# Patient Record
Sex: FEMALE | Race: WHITE | NOT HISPANIC OR LATINO | Employment: UNEMPLOYED | URBAN - NONMETROPOLITAN AREA
[De-identification: names, ages, dates, MRNs, and addresses within clinical notes are randomized per-mention and may not be internally consistent; named-entity substitution may affect disease eponyms.]

---

## 2023-10-06 PROBLEM — N91.4 SECONDARY OLIGOMENORRHEA: Status: ACTIVE | Noted: 2023-10-06

## 2023-10-06 PROBLEM — F32.9 REACTIVE DEPRESSION: Status: ACTIVE | Noted: 2023-10-06

## 2023-10-06 PROBLEM — O14.00 MILD PRE-ECLAMPSIA (HHS-HCC): Status: ACTIVE | Noted: 2023-10-06

## 2023-10-06 PROBLEM — O99.210 OBESITY AFFECTING PREGNANCY, ANTEPARTUM (HHS-HCC): Status: ACTIVE | Noted: 2023-10-06

## 2023-10-06 PROBLEM — E28.2 PCOS (POLYCYSTIC OVARIAN SYNDROME): Status: ACTIVE | Noted: 2023-10-06

## 2023-10-06 PROBLEM — N70.11 HYDROSALPINX: Status: ACTIVE | Noted: 2023-10-06

## 2023-10-06 PROBLEM — R00.0 TACHYCARDIA: Status: ACTIVE | Noted: 2023-10-06

## 2023-10-06 PROBLEM — N92.0 MENORRHAGIA: Status: ACTIVE | Noted: 2023-10-06

## 2023-10-06 PROBLEM — I20.9 ANGINA PECTORIS (CMS-HCC): Status: ACTIVE | Noted: 2023-10-06

## 2023-10-06 PROBLEM — Z86.32 HISTORY OF GESTATIONAL DIABETES: Status: ACTIVE | Noted: 2023-10-06

## 2023-10-06 PROBLEM — Z87.59 HISTORY OF PREGNANCY INDUCED HYPERTENSION: Status: ACTIVE | Noted: 2023-10-06

## 2023-10-06 PROBLEM — N83.201 HEMORRHAGIC CYST OF RIGHT OVARY: Status: ACTIVE | Noted: 2023-10-06

## 2023-10-06 PROBLEM — O92.70 LACTATION DISORDER (HHS-HCC): Status: ACTIVE | Noted: 2023-10-06

## 2023-10-06 PROBLEM — N83.292 OTHER OVARIAN CYST, LEFT SIDE: Status: ACTIVE | Noted: 2023-10-06

## 2023-10-06 PROBLEM — N91.2 AMENORRHEA: Status: ACTIVE | Noted: 2023-10-06

## 2023-10-06 PROBLEM — N97.9 FEMALE INFERTILITY: Status: ACTIVE | Noted: 2023-10-06

## 2023-10-06 PROBLEM — N92.1 PROLONGED MENSTRUATION: Status: ACTIVE | Noted: 2023-10-06

## 2023-10-06 PROBLEM — F43.9 STRESS: Status: ACTIVE | Noted: 2023-10-06

## 2023-10-06 PROBLEM — O99.211 OBESITY COMPLICATING PREGNANCY, FIRST TRIMESTER (HHS-HCC): Status: ACTIVE | Noted: 2023-10-06

## 2023-10-06 PROBLEM — N93.9 ABNORMAL UTERINE BLEEDING (AUB): Status: ACTIVE | Noted: 2023-10-06

## 2023-10-06 PROBLEM — J03.91 RECURRENT TONSILLITIS: Status: ACTIVE | Noted: 2023-10-06

## 2023-10-06 PROBLEM — G43.909 MIGRAINE HEADACHE: Status: ACTIVE | Noted: 2023-10-06

## 2023-10-06 PROBLEM — N92.6 IRREGULAR MENSES: Status: ACTIVE | Noted: 2023-10-06

## 2023-10-06 RX ORDER — CHOLECALCIFEROL (VITAMIN D3) 25 MCG
25 TABLET ORAL DAILY
COMMUNITY
End: 2023-10-12 | Stop reason: WASHOUT

## 2023-10-06 RX ORDER — IBUPROFEN 600 MG/1
600 TABLET ORAL EVERY 6 HOURS PRN
COMMUNITY
Start: 2022-01-30 | End: 2023-10-12 | Stop reason: WASHOUT

## 2023-10-06 RX ORDER — SERTRALINE HYDROCHLORIDE 50 MG/1
50 TABLET, FILM COATED ORAL DAILY
COMMUNITY
End: 2023-10-12 | Stop reason: WASHOUT

## 2023-10-06 RX ORDER — METOPROLOL TARTRATE 25 MG/1
25 TABLET, FILM COATED ORAL AS NEEDED
COMMUNITY
Start: 2020-06-26 | End: 2023-10-12 | Stop reason: WASHOUT

## 2023-10-06 RX ORDER — CYCLOBENZAPRINE HCL 10 MG
10 TABLET ORAL EVERY 8 HOURS PRN
COMMUNITY
End: 2023-10-12 | Stop reason: WASHOUT

## 2023-10-06 RX ORDER — B-COMPLEX WITH VITAMIN C
TABLET ORAL
COMMUNITY
End: 2023-10-12 | Stop reason: WASHOUT

## 2023-10-06 RX ORDER — ACETAMINOPHEN 325 MG/1
650 TABLET ORAL EVERY 4 HOURS PRN
COMMUNITY
Start: 2020-11-20 | End: 2023-10-12 | Stop reason: WASHOUT

## 2023-10-06 RX ORDER — OXYCODONE AND ACETAMINOPHEN 5; 325 MG/1; MG/1
1 TABLET ORAL EVERY 6 HOURS PRN
COMMUNITY
End: 2023-10-12 | Stop reason: WASHOUT

## 2023-10-06 RX ORDER — ASPIRIN 81 MG/1
TABLET ORAL
COMMUNITY
End: 2023-10-12 | Stop reason: WASHOUT

## 2023-10-12 ENCOUNTER — OFFICE VISIT (OUTPATIENT)
Dept: OBSTETRICS AND GYNECOLOGY | Facility: CLINIC | Age: 28
End: 2023-10-12
Payer: COMMERCIAL

## 2023-10-12 VITALS
HEIGHT: 62 IN | SYSTOLIC BLOOD PRESSURE: 122 MMHG | BODY MASS INDEX: 44.72 KG/M2 | WEIGHT: 243 LBS | DIASTOLIC BLOOD PRESSURE: 82 MMHG

## 2023-10-12 DIAGNOSIS — N91.1 SECONDARY AMENORRHEA: Primary | ICD-10-CM

## 2023-10-12 DIAGNOSIS — E28.2 PCOS (POLYCYSTIC OVARIAN SYNDROME): ICD-10-CM

## 2023-10-12 PROBLEM — D62 ACUTE BLOOD LOSS ANEMIA: Status: RESOLVED | Noted: 2020-09-12 | Resolved: 2023-10-12

## 2023-10-12 LAB — PREGNANCY TEST URINE, POC: NEGATIVE

## 2023-10-12 PROCEDURE — 81025 URINE PREGNANCY TEST: CPT | Performed by: NURSE PRACTITIONER

## 2023-10-12 PROCEDURE — 3074F SYST BP LT 130 MM HG: CPT | Performed by: NURSE PRACTITIONER

## 2023-10-12 PROCEDURE — 3079F DIAST BP 80-89 MM HG: CPT | Performed by: NURSE PRACTITIONER

## 2023-10-12 PROCEDURE — 99213 OFFICE O/P EST LOW 20 MIN: CPT | Performed by: NURSE PRACTITIONER

## 2023-10-12 PROCEDURE — 1036F TOBACCO NON-USER: CPT | Performed by: NURSE PRACTITIONER

## 2023-10-12 RX ORDER — MEDROXYPROGESTERONE ACETATE 10 MG/1
TABLET ORAL
Qty: 10 TABLET | Refills: 11 | Status: SHIPPED | OUTPATIENT
Start: 2023-10-12

## 2023-10-12 NOTE — PROGRESS NOTES
Chief Complaint    Amenorrhea        HPI       Amenorrhea     Additional comments: Chaperone: declined             Comments    Missed period since July, H/O PCOS          Last edited by Dior Will MA on 10/12/2023 10:43 AM.         27 y.o.  female presents for evaluation of secondary amenorrhea.  H/O PCOS.  She had a baby earlier this year in 2023.  Was having regular, normal periods up until July. Periods lasting 5-6 days. Denies abnormal bleeding.  No period since 2023.   Denies pelvic pain.  She is sexually active. Using condoms for contraception.   Does not want to be on birth control due to h/o side effects in the past.  Has had to do progesterone courses to trigger periods in past.    She does endorse recent weight gain. BMI is 44.   OBGYN: . Normal pap history.  Family history: Graves disease - mother.   No family h/o GYN related cancers.     Review of Systems   Genitourinary:  Positive for menstrual problem.   All other systems reviewed and are negative.       Physical Exam  Constitutional:       Appearance: Normal appearance.   Cardiovascular:      Rate and Rhythm: Normal rate and regular rhythm.   Pulmonary:      Effort: Pulmonary effort is normal.      Breath sounds: Normal breath sounds.   Abdominal:      Palpations: Abdomen is soft.   Genitourinary:     Comments: Pelvic exam deferred  Musculoskeletal:         General: Normal range of motion.      Cervical back: Normal range of motion and neck supple.   Skin:     General: Skin is warm and dry.   Neurological:      Mental Status: She is alert.   Psychiatric:         Mood and Affect: Mood normal.         Behavior: Behavior normal.     1. Secondary amenorrhea  -Likely secondary to known h/o PCOS.   -Negative POCT pregnancy, urine manually resulted  -Rx medroxyPROGESTERone (Provera) 10 mg tablet; Take 1 tablet by mouth daily for days first 10 days of every month  Dispense: 10 tablet; Refill: 11  -Advised to follow up if period does not  start within 7 days of completing medication or for any abnormal uterine bleeding.    2. PCOS (polycystic ovarian syndrome)  -Counseled risk reduction and management options including hormonal birth control vs cyclic progesterone.  -Patient does not want to be on birth control due to undesirable side effects of this in the past.  -Plan cyclic progesterone for first 10 days of every month.  -Encouraged weight loss through healthy diet and exercise. Even a reduction in 5-10% of body weight may help restore ovulatory cycles.   -Rx medroxyPROGESTERone (Provera) 10 mg tablet; Take 1 tablet by mouth daily for days first 10 days of every month  Dispense: 10 tablet; Refill: 11

## 2023-10-30 ENCOUNTER — TELEPHONE (OUTPATIENT)
Dept: OBSTETRICS AND GYNECOLOGY | Facility: CLINIC | Age: 28
End: 2023-10-30

## 2023-11-06 ENCOUNTER — HOSPITAL ENCOUNTER (OUTPATIENT)
Facility: HOSPITAL | Age: 28
Setting detail: OUTPATIENT SURGERY
End: 2023-11-06
Attending: PODIATRIST | Admitting: PODIATRIST
Payer: COMMERCIAL

## 2024-03-20 ENCOUNTER — APPOINTMENT (OUTPATIENT)
Dept: PREADMISSION TESTING | Facility: HOSPITAL | Age: 29
End: 2024-03-20
Payer: COMMERCIAL

## 2024-05-16 ENCOUNTER — TELEPHONE (OUTPATIENT)
Dept: OBSTETRICS AND GYNECOLOGY | Facility: CLINIC | Age: 29
End: 2024-05-16
Payer: COMMERCIAL

## 2024-07-02 ENCOUNTER — APPOINTMENT (OUTPATIENT)
Dept: OBSTETRICS AND GYNECOLOGY | Facility: CLINIC | Age: 29
End: 2024-07-02
Payer: COMMERCIAL

## 2024-07-11 ENCOUNTER — APPOINTMENT (OUTPATIENT)
Dept: OBSTETRICS AND GYNECOLOGY | Facility: CLINIC | Age: 29
End: 2024-07-11
Payer: COMMERCIAL

## 2024-10-14 DIAGNOSIS — N91.1 SECONDARY AMENORRHEA: ICD-10-CM

## 2024-10-14 DIAGNOSIS — E28.2 PCOS (POLYCYSTIC OVARIAN SYNDROME): ICD-10-CM

## 2024-10-15 RX ORDER — MEDROXYPROGESTERONE ACETATE 10 MG/1
TABLET ORAL
Qty: 10 TABLET | Refills: 0 | Status: SHIPPED | OUTPATIENT
Start: 2024-10-15

## 2024-11-06 NOTE — PROGRESS NOTES
Subjective     Patient ID: Adalgisa Sandoval is a 28 y.o. female who presents for No chief complaint on file..    HPI    Review of Systems    Social history:  reports that she has never smoked. She has never used smokeless tobacco. She reports current alcohol use. She reports that she does not use drugs.  Medical history:   Past Medical History:   Diagnosis Date    Acute blood loss anemia 09/12/2020    Acute pharyngitis, unspecified 10/22/2013    Acute viral pharyngitis    Acute upper respiratory infection, unspecified 10/22/2013    Acute upper respiratory infection    Cough, unspecified 10/22/2013    Cough    Encounter for pregnancy test, result unknown     Encounter for pregnancy test    Menstrual migraine 08/28/2014    Personal history of other diseases of the musculoskeletal system and connective tissue 02/27/2017    History of low back pain    Personal history of other diseases of the respiratory system 02/16/2018    History of acute pharyngitis    Personal history of other drug therapy     History of varicella vaccination    Polycystic ovarian syndrome 06/26/2019    PCOS (polycystic ovarian syndrome)    Preeclampsia in postpartum period ([facility]) 09/12/2020    Sprain of ligaments of lumbar spine, initial encounter 02/27/2017    Lumbar sprain    Strain of unspecified muscle and tendon at ankle and foot level, left foot, initial encounter 07/31/2017    Strain of left ankle, initial encounter    Strain of unspecified muscle and tendon at ankle and foot level, left foot, initial encounter 07/31/2017    Left ankle strain      SurgHx:   Past Surgical History:   Procedure Laterality Date    OTHER SURGICAL HISTORY  08/12/2019    Endometrial biopsy      Fhx:   Family History   Problem Relation Name Age of Onset    Graves' disease Mother      No Known Problems Father      Diabetes Brother      Cerebral aneurysm Other Aunt       Medications:   Current Outpatient Medications:     medroxyPROGESTERone (Provera) 10 mg  tablet, TAKE 1 TABLET BY MOUTH DAILY FOR THE FIRST 10 DAYS OF EVERY MONTH, Disp: 10 tablet, Rfl: 0   Allergies:   Allergies   Allergen Reactions    Ibuprofen Unknown        Review of systems completed and unremarkable other than what is documented in HPI.    Objective   There were no vitals taken for this visit.  Physical Exam    Gen: No acute distress, alert and oriented x3, pleasant   HEENT: moist mucous membranes, b/l external auditory canals are clear of debris, TMs within normal limits, no oropharyngeal lesions, eomi, perrla   Neck: thyroid within normal limits, no lymphadenopathy   CV: RRR, normal S1/S2, no murmur   Resp: Clear to auscultation bilaterally, no wheezes or rhonchi appreciated  Abd: soft, nontender, non-distended, no guarding/rigidity, bowel sounds present  Extr: no edema, no calf tenderness  Derm: Skin is warm and dry, no rashes appreciated  Psych: mood is good, affect is congruent, good hygiene, normal speech and eye contact  Neuro: cranial nerves grossly intact, normal gait      Assessment/Plan   {Assess/PlanSmartLinks:77189}

## 2024-11-07 ENCOUNTER — APPOINTMENT (OUTPATIENT)
Dept: PRIMARY CARE | Facility: CLINIC | Age: 29
End: 2024-11-07
Payer: COMMERCIAL

## 2024-11-13 NOTE — PROGRESS NOTES
Subjective     Patient ID: Adalgisa Sandoval is a 28 y.o. female who presents for a new patient visit    HPI  Adalgisa is here to establish care has questions about weight loss.     Weight loss: Summer was at 275# today she is 243#, Marisol goal weight is 170#, she has been cutting back on calories and carbohydrates, eating , and trying to exercise. She is interested in medications to help aide the process.   Currently is taking a metabolism gummy by Vlad,     In the past she had SOB, Stuck in the throat feeling, dizzy, palpitations, pounding heart  - lays down it gets really bad, has not had it in two months,     Was on zoloft in the past for depression.Feels it has not improved since having her children.     She does need a TB test completed for work    Social history:    She has never used tobacco products  Does not vape   reports current alcohol use- socially  She reports that she does not use drugs.    Past Medical History:   Diagnosis Date    Acute blood loss anemia 09/12/2020    Acute pharyngitis, unspecified 10/22/2013    Acute viral pharyngitis    Acute upper respiratory infection, unspecified 10/22/2013    Acute upper respiratory infection    Cough, unspecified 10/22/2013    Cough    Encounter for pregnancy test, result unknown     Encounter for pregnancy test    Menstrual migraine 08/28/2014    Personal history of other diseases of the musculoskeletal system and connective tissue 02/27/2017    History of low back pain    Personal history of other diseases of the respiratory system 02/16/2018    History of acute pharyngitis    Personal history of other drug therapy     History of varicella vaccination    Polycystic ovarian syndrome 06/26/2019    PCOS (polycystic ovarian syndrome)    Preeclampsia in [facility] period ([facility]) 09/12/2020    Sprain of ligaments of lumbar spine, initial encounter 02/27/2017    Lumbar sprain    Strain of unspecified muscle and tendon at ankle and foot level, left foot,  initial encounter 07/31/2017    Strain of left ankle, initial encounter    Strain of unspecified muscle and tendon at ankle and foot level, left foot, initial encounter 07/31/2017    Left ankle strain     Past Surgical History:   Procedure Laterality Date    OTHER SURGICAL HISTORY  08/12/2019    Endometrial biopsy     Family History   Problem Relation Name Age of Onset    Graves' disease Mother      No Known Problems Father      Diabetes Brother      Cerebral aneurysm Other Aunt       Medications:   None on file    Allergies   Allergen Reactions    Ibuprofen Unknown        Review of systems completed and unremarkable other than what is documented in HPI.    Objective   There were no vitals taken for this visit.  Physical Exam    Gen: No acute distress, alert and oriented x3, pleasant   HEENT: moist mucous membranes, b/l external auditory canals are clear of debris, TMs within normal limits, no oropharyngeal lesions, eomi, perrla   Neck: thyroid within normal limits, no lymphadenopathy   CV: RRR, normal S1/S2, no murmur   Resp: Clear to auscultation bilaterally, no wheezes or rhonchi appreciated  Abd: soft, nontender, non-distended, no guarding/rigidity, bowel sounds present  Extr: no edema, no calf tenderness  Derm: Skin is warm and dry, no rashes appreciated  Psych: mood is good, affect is congruent, good hygiene, normal speech and eye contact  Neuro: cranial nerves grossly intact, normal gait  Assessment & Plan  Encounter for screening for respiratory tuberculosis  Orders:    T-Spot TB; Future    Anxiety  Orders:    buPROPion XL (Wellbutrin XL) 150 mg 24 hr tablet; Take 1 tablet (150 mg) by mouth once daily in the morning. Do not crush, chew, or split.    Class 3 severe obesity due to excess calories without serious comorbidity with body mass index (BMI) of 40.0 to 44.9 in adult    Orders:    Referral to Nutrition Services; Future      #HCM  Routine labs  Pap done 5/2023 normal  Follow up 6 weeks

## 2024-11-18 ENCOUNTER — APPOINTMENT (OUTPATIENT)
Dept: PRIMARY CARE | Facility: CLINIC | Age: 29
End: 2024-11-18
Payer: COMMERCIAL

## 2024-11-18 ENCOUNTER — LAB (OUTPATIENT)
Dept: LAB | Facility: LAB | Age: 29
End: 2024-11-18
Payer: COMMERCIAL

## 2024-11-18 VITALS — SYSTOLIC BLOOD PRESSURE: 110 MMHG | DIASTOLIC BLOOD PRESSURE: 75 MMHG | HEART RATE: 90 BPM

## 2024-11-18 DIAGNOSIS — Z78.9 HEALTH MANAGEMENT DEFICIT: ICD-10-CM

## 2024-11-18 DIAGNOSIS — Z00.00 ROUTINE GENERAL MEDICAL EXAMINATION AT A HEALTH CARE FACILITY: ICD-10-CM

## 2024-11-18 DIAGNOSIS — Z11.1 ENCOUNTER FOR SCREENING FOR RESPIRATORY TUBERCULOSIS: ICD-10-CM

## 2024-11-18 DIAGNOSIS — E66.01 CLASS 3 SEVERE OBESITY DUE TO EXCESS CALORIES WITHOUT SERIOUS COMORBIDITY WITH BODY MASS INDEX (BMI) OF 40.0 TO 44.9 IN ADULT: Primary | ICD-10-CM

## 2024-11-18 DIAGNOSIS — F41.9 ANXIETY: ICD-10-CM

## 2024-11-18 DIAGNOSIS — E66.813 CLASS 3 SEVERE OBESITY DUE TO EXCESS CALORIES WITHOUT SERIOUS COMORBIDITY WITH BODY MASS INDEX (BMI) OF 40.0 TO 44.9 IN ADULT: Primary | ICD-10-CM

## 2024-11-18 LAB
ALBUMIN SERPL BCP-MCNC: 4.5 G/DL (ref 3.4–5)
ALP SERPL-CCNC: 73 U/L (ref 33–110)
ALT SERPL W P-5'-P-CCNC: 42 U/L (ref 7–45)
ANION GAP SERPL CALC-SCNC: 10 MMOL/L (ref 10–20)
AST SERPL W P-5'-P-CCNC: 28 U/L (ref 9–39)
BASOPHILS # BLD AUTO: 0.05 X10*3/UL (ref 0–0.1)
BASOPHILS NFR BLD AUTO: 0.6 %
BILIRUB SERPL-MCNC: 0.6 MG/DL (ref 0–1.2)
BUN SERPL-MCNC: 12 MG/DL (ref 6–23)
CALCIUM SERPL-MCNC: 9.6 MG/DL (ref 8.6–10.3)
CHLORIDE SERPL-SCNC: 104 MMOL/L (ref 98–107)
CHOLEST SERPL-MCNC: 185 MG/DL (ref 0–199)
CHOLESTEROL/HDL RATIO: 5.5
CO2 SERPL-SCNC: 30 MMOL/L (ref 21–32)
CREAT SERPL-MCNC: 0.78 MG/DL (ref 0.5–1.05)
EGFRCR SERPLBLD CKD-EPI 2021: >90 ML/MIN/1.73M*2
EOSINOPHIL # BLD AUTO: 0.11 X10*3/UL (ref 0–0.7)
EOSINOPHIL NFR BLD AUTO: 1.3 %
ERYTHROCYTE [DISTWIDTH] IN BLOOD BY AUTOMATED COUNT: 12.8 % (ref 11.5–14.5)
GLUCOSE SERPL-MCNC: 70 MG/DL (ref 74–99)
HCT VFR BLD AUTO: 44.3 % (ref 36–46)
HDLC SERPL-MCNC: 33.8 MG/DL
HGB BLD-MCNC: 13.8 G/DL (ref 12–16)
IMM GRANULOCYTES # BLD AUTO: 0.03 X10*3/UL (ref 0–0.7)
IMM GRANULOCYTES NFR BLD AUTO: 0.3 % (ref 0–0.9)
LDLC SERPL CALC-MCNC: 122 MG/DL
LYMPHOCYTES # BLD AUTO: 2.51 X10*3/UL (ref 1.2–4.8)
LYMPHOCYTES NFR BLD AUTO: 29.1 %
MCH RBC QN AUTO: 27.2 PG (ref 26–34)
MCHC RBC AUTO-ENTMCNC: 31.2 G/DL (ref 32–36)
MCV RBC AUTO: 87 FL (ref 80–100)
MONOCYTES # BLD AUTO: 0.6 X10*3/UL (ref 0.1–1)
MONOCYTES NFR BLD AUTO: 7 %
NEUTROPHILS # BLD AUTO: 5.32 X10*3/UL (ref 1.2–7.7)
NEUTROPHILS NFR BLD AUTO: 61.7 %
NON HDL CHOLESTEROL: 151 MG/DL (ref 0–149)
NRBC BLD-RTO: 0 /100 WBCS (ref 0–0)
PLATELET # BLD AUTO: 332 X10*3/UL (ref 150–450)
POTASSIUM SERPL-SCNC: 4.7 MMOL/L (ref 3.5–5.3)
PROT SERPL-MCNC: 7.6 G/DL (ref 6.4–8.2)
RBC # BLD AUTO: 5.08 X10*6/UL (ref 4–5.2)
SODIUM SERPL-SCNC: 139 MMOL/L (ref 136–145)
TRIGL SERPL-MCNC: 147 MG/DL (ref 0–149)
TSH SERPL-ACNC: 1.64 MIU/L (ref 0.44–3.98)
VLDL: 29 MG/DL (ref 0–40)
WBC # BLD AUTO: 8.6 X10*3/UL (ref 4.4–11.3)

## 2024-11-18 PROCEDURE — 85025 COMPLETE CBC W/AUTO DIFF WBC: CPT

## 2024-11-18 PROCEDURE — 3078F DIAST BP <80 MM HG: CPT

## 2024-11-18 PROCEDURE — 86481 TB AG RESPONSE T-CELL SUSP: CPT

## 2024-11-18 PROCEDURE — 3074F SYST BP LT 130 MM HG: CPT

## 2024-11-18 PROCEDURE — 84443 ASSAY THYROID STIM HORMONE: CPT

## 2024-11-18 PROCEDURE — 80053 COMPREHEN METABOLIC PANEL: CPT

## 2024-11-18 PROCEDURE — 1036F TOBACCO NON-USER: CPT

## 2024-11-18 PROCEDURE — 80061 LIPID PANEL: CPT

## 2024-11-18 PROCEDURE — 99204 OFFICE O/P NEW MOD 45 MIN: CPT

## 2024-11-18 PROCEDURE — 36415 COLL VENOUS BLD VENIPUNCTURE: CPT

## 2024-11-18 RX ORDER — BUPROPION HYDROCHLORIDE 150 MG/1
150 TABLET ORAL EVERY MORNING
Qty: 30 TABLET | Refills: 1 | Status: SHIPPED | OUTPATIENT
Start: 2024-11-18 | End: 2025-01-17

## 2024-11-18 NOTE — PATIENT INSTRUCTIONS
Nutrition Services:  [place] [phone]  [facility] [phone]

## 2024-11-20 LAB
NIL(NEG) CONTROL SPOT COUNT: NORMAL
PANEL A SPOT COUNT: 0
PANEL B SPOT COUNT: 0
POS CONTROL SPOT COUNT: NORMAL
T-SPOT. TB INTERPRETATION: NEGATIVE

## 2024-11-24 PROBLEM — E66.813 CLASS 3 SEVERE OBESITY DUE TO EXCESS CALORIES WITHOUT SERIOUS COMORBIDITY WITH BODY MASS INDEX (BMI) OF 40.0 TO 44.9 IN ADULT: Status: ACTIVE | Noted: 2024-11-24

## 2024-11-24 PROBLEM — E66.01 CLASS 3 SEVERE OBESITY DUE TO EXCESS CALORIES WITHOUT SERIOUS COMORBIDITY WITH BODY MASS INDEX (BMI) OF 40.0 TO 44.9 IN ADULT: Status: ACTIVE | Noted: 2024-11-24

## 2024-11-24 PROBLEM — Z11.1 ENCOUNTER FOR SCREENING FOR RESPIRATORY TUBERCULOSIS: Status: ACTIVE | Noted: 2024-11-24

## 2024-11-24 PROBLEM — F41.9 ANXIETY: Status: ACTIVE | Noted: 2024-11-24

## 2024-11-24 PROBLEM — Z78.9: Status: ACTIVE | Noted: 2024-11-24

## 2024-11-24 NOTE — ASSESSMENT & PLAN NOTE
Orders:    buPROPion XL (Wellbutrin XL) 150 mg 24 hr tablet; Take 1 tablet (150 mg) by mouth once daily in the morning. Do not crush, chew, or split.

## 2024-12-06 ENCOUNTER — TELEPHONE (OUTPATIENT)
Dept: PRIMARY CARE | Facility: CLINIC | Age: 29
End: 2024-12-06
Payer: COMMERCIAL

## 2025-01-03 ENCOUNTER — APPOINTMENT (OUTPATIENT)
Dept: PRIMARY CARE | Facility: CLINIC | Age: 30
End: 2025-01-03
Payer: COMMERCIAL

## 2025-01-09 ENCOUNTER — NUTRITION (OUTPATIENT)
Dept: NUTRITION | Facility: HOSPITAL | Age: 30
End: 2025-01-09
Payer: COMMERCIAL

## 2025-01-09 VITALS — HEIGHT: 65 IN | BODY MASS INDEX: 40.44 KG/M2

## 2025-01-09 DIAGNOSIS — E66.01 CLASS 3 SEVERE OBESITY DUE TO EXCESS CALORIES WITHOUT SERIOUS COMORBIDITY WITH BODY MASS INDEX (BMI) OF 40.0 TO 44.9 IN ADULT: ICD-10-CM

## 2025-01-09 DIAGNOSIS — E66.813 CLASS 3 SEVERE OBESITY DUE TO EXCESS CALORIES WITHOUT SERIOUS COMORBIDITY WITH BODY MASS INDEX (BMI) OF 40.0 TO 44.9 IN ADULT: ICD-10-CM

## 2025-01-09 DIAGNOSIS — Z00.00 ROUTINE GENERAL MEDICAL EXAMINATION AT A HEALTH CARE FACILITY: ICD-10-CM

## 2025-01-09 PROCEDURE — 97802 MEDICAL NUTRITION INDIV IN: CPT

## 2025-01-09 NOTE — PROGRESS NOTES
Nutrition Initial Assessment:     Patient Adalgisa Sandoval is a 29 y.o. female being seen at [facility] who was referred by JEROME Hightower  for   1. Routine general medical examination at a health care facility  Referral to Nutrition Services      2. Class 3 severe obesity due to excess calories without serious comorbidity with body mass index (BMI) of 40.0 to 44.9 in adult  Referral to Nutrition Services          Nutrition Assessment    Patient Active Problem List   Diagnosis    Abnormal uterine bleeding (AUB)    Recurrent tonsillitis    Angina pectoris    Female infertility    Hemorrhagic cyst of right ovary    Other ovarian cyst, left side    History of gestational diabetes    Hydrosalpinx    Menorrhagia    Migraine headache    Mild pre-eclampsia ([facility]-HCC)    Obesity complicating pregnancy, first trimester ([facility]HS-HCC)    Obesity affecting pregnancy, antepartum ([facility]HS-HCC)    PCOS (polycystic ovarian syndrome)    Postpartum hypertension ([facility]HS-HCC)    Amenorrhea    Irregular menses    Prolonged menstruation    Secondary oligomenorrhea    Reactive depression    Stress    Tachycardia    BMI 39.0-39.9,adult    History of pregnancy induced hypertension    Lactation disorder (HHS-HCC)    Health management deficit    Class 3 severe obesity due to excess calories without serious comorbidity with body mass index (BMI) of 40.0 to 44.9 in adult    Anxiety    Encounter for screening for respiratory tuberculosis     Nutrition History:  Food & Nutrition History: Pt states that she has had a hard time with weight loss and finding changes that are sustainable.Pt would like to learn how to lose weight with PCOS. Pt made dietary changes over the summer like limiting portions, increasing protein and decreasing carbs, and reducing late night snacking and she was able to los about 25lb. Pt reports current weight at 250lb and previous weight was 275-20lb. Pt and provider set goal weight of 180lb. Pt states she  "does not eat much during the day, and will eat dinner and then overeat snacks after dinner.  Food Allergies: NKFA  Food Intolerances: N/A  Vitamin/mineral intake:    Other (Comment) (OLLI metabolism)  GI Symptoms: none; Occurring >2 weeks? no  Oral Problems: denies; Dentition: own  Sleep Habits: 5-6 hrs disrupted, 7+ hrs disrupted (Child)    Diet Recall:  Meal 1: Breakfast- Sometimes what kids are eating  Meal 2: Lunch- If eaten- sandwhich with lunch meat and whole wheat bread  Meal 3: DInner- Vegetable, meat, carb  Snacks: Bowl of cereal with whole milk, pork rinds, beef sticks  Food Variety: Present  Oral Nutrition Supplement Use: Premier Protein (Herbalife)  Fluid Intake: Water, aloe or green tea, occasional juice  Energy Intake: Good > 75 %    Food Preparation:  Cooking: Patient, Spouse/Significant Other  Grocery Shopping: Patient  Dining Out: 1 to 3 times a month    Physical Activity:   Work as [employer], on feet all day. Step machine. Would like to increase activity.    Anthropometrics:  Height: 165.1 cm (5' 5\")   Weight:  (250lb stated weight)                   Weight History:   Daily Weight  10/12/23 : 110 kg (243 lb)  05/04/23 : 108 kg (238 lb)  03/31/23 : 106 kg (233 lb)  03/23/23 : 114 kg (251 lb)  03/16/23 : 117 kg (259 lb)  03/06/23 : 117 kg (257 lb)  03/02/23 : 117 kg (257 lb)  02/28/23 : 116 kg (255 lb 8.2 oz)  02/16/23 : 115 kg (253 lb)  02/09/23 : 114 kg (251 lb)    Weight Change %:  Weight History / % Weight Change: Pt reports 25lb intentional weight loss since summer. Chart has poor weight hx.    Nutrition Focused Physical Exam Findings:  Deferred as pt visually appears well-nourished with no signs of muscle or subcutaneous fat losses    Nutrition Significant Labs:  Lipid Panel trend:    Recent Labs     11/18/24  1205 06/26/19  0906 02/12/18  1415   CHOL 185 156 142   HDL 33.8 36.0* 36.0*   LDLCALC 122*  --   --    LDLF  --  99 78   VLDL 29 21 28   TRIG 147 104 141    and DM specific labs:   Recent " Labs     11/16/23  1606 08/23/22  1637 12/15/21  1453   HGBA1C 5.5 5.4 5.3     Nutrition Specific Medications:  Current Outpatient Medications   Medication Instructions    buPROPion XL (WELLBUTRIN XL) 150 mg, oral, Every morning, Do not crush, chew, or split.       Estimated Needs:  Total Energy Estimated Needs (kCal): 1700 kCal; Method for Estimating Needs: MSJ x AF 1.2 - 500kcal for weight loss  Total Protein Estimated Needs (g): 90 g; Method for Estimating Needs: 0.8g/kg   ;    Total Fluid Estimated Needs (mL): 2244 mL; Method for Estimating Needs: 1ml/kcal     Nutrition Diagnosis      Nutrition Diagnosis  Patient has Nutrition Diagnosis: Yes  Diagnosis Status (1): New  Nutrition Diagnosis 1: Excessive energy intake  Related to (1): food and nutrition related knowledge deficit  As Evidenced by (1): uncontrolled portion sizes of foods per recall     Nutrition Interventions/Recommendations   Nutrition Prescription: Mediterranean diet, Portion Controlled diet, MyPlate Method, Increased Fiber, Increased Protein    Nutrition Interventions:   Food and Nutrient Delivery: Meals & Snacks: General Healthful Diet, Modify Composition of Meals/Snacks, Energy-modified diet, Fiber-modified diet, Protein-modified diet  Goals: Follow mediterranean diet with 3 meals per day and up to 2 snacks per day at consistent times while following serving sizes on the food label. Follow the MyPlate Method for meals and snacks. Increase fiber and protein by consuming each with every meal and snack.     Coordination of Care:   N/A    Nutrition Education:   Nutrition Education Content: Content related nutrition education  Goals: Patient goals: 1. Increase fiber intake by including a variety of fruits, vegetables, and grains with each meal and snack. 2. Add small meal/large snack during the day to better help with satiety at night.    Nutrition Education Topics Discussed:   - Mediterranean diet  -Label Reading and portion sizes  -Reducing  saturated fat  -Reducing added sugar  -Increasing fiber  -Adequate protein  -Foods affecting blood sugar  -Whole grains    Educational Handouts Provided:   Mediterranean diet, label reading, healthy snacks for adults    Readiness to Change : Good  Level of Understanding : Good  Anticipated Compliant : Good     Nutrition Monitoring and Evaluation   Food/Nutrient Related History Monitoring  Monitoring and Evaluation Plan: Meal/snack pattern, Amount of food, Protein intake, Fiber intake  Amount of Food: Estimated amout of food  Criteria: Follow serving sizes by reading food labels and measuring foods.  Meal/Snack Pattern: Estimated meal and snack pattern  Criteria: Consume 3 meals and up to 2 snacks per day while following the Mediterranean diet and MyPlate Method.  Estimated protein intake: Estimated protein intake  Criteria: Consume at least 25g protein with each meal and 10-15g with each snack for adequate protein intake.  Estimated fiber intake: Estimated fiber intake  Criteria: Consume 25g fiber each day by consuming a variety of fruits, vegetables, and whole grains.     Body Composition/Growth/Weight History  Monitoring and Evaluation Plan: Weight  Weight: Measured weight  Criteria: Monitor weight changes for healthy steady weight loss.     Follow Up: 6-8 weeks    Time Spent  Time spent directly with patient, family or caregiver: 45 minutes  Documentation Time: 15 minutes

## 2025-01-16 DIAGNOSIS — Z78.9 HEALTH MANAGEMENT DEFICIT: ICD-10-CM

## 2025-01-16 DIAGNOSIS — F41.9 ANXIETY: ICD-10-CM

## 2025-01-16 DIAGNOSIS — Z00.00 ROUTINE GENERAL MEDICAL EXAMINATION AT A HEALTH CARE FACILITY: ICD-10-CM

## 2025-01-17 RX ORDER — BUPROPION HYDROCHLORIDE 150 MG/1
150 TABLET ORAL EVERY MORNING
Qty: 30 TABLET | Refills: 0 | Status: SHIPPED | OUTPATIENT
Start: 2025-01-17

## 2025-01-24 ENCOUNTER — APPOINTMENT (OUTPATIENT)
Dept: PRIMARY CARE | Facility: CLINIC | Age: 30
End: 2025-01-24
Payer: COMMERCIAL

## 2025-02-19 DIAGNOSIS — Z00.00 ROUTINE GENERAL MEDICAL EXAMINATION AT A HEALTH CARE FACILITY: ICD-10-CM

## 2025-02-19 DIAGNOSIS — Z78.9 HEALTH MANAGEMENT DEFICIT: ICD-10-CM

## 2025-02-19 DIAGNOSIS — F41.9 ANXIETY: ICD-10-CM

## 2025-02-19 RX ORDER — BUPROPION HYDROCHLORIDE 150 MG/1
150 TABLET ORAL EVERY MORNING
Qty: 30 TABLET | Refills: 0 | Status: SHIPPED | OUTPATIENT
Start: 2025-02-19

## 2025-02-21 ENCOUNTER — APPOINTMENT (OUTPATIENT)
Dept: PRIMARY CARE | Facility: CLINIC | Age: 30
End: 2025-02-21
Payer: COMMERCIAL

## 2025-03-26 DIAGNOSIS — Z78.9 HEALTH MANAGEMENT DEFICIT: ICD-10-CM

## 2025-03-26 DIAGNOSIS — Z00.00 ROUTINE GENERAL MEDICAL EXAMINATION AT A HEALTH CARE FACILITY: ICD-10-CM

## 2025-03-26 DIAGNOSIS — F41.9 ANXIETY: ICD-10-CM

## 2025-03-26 RX ORDER — BUPROPION HYDROCHLORIDE 150 MG/1
150 TABLET ORAL EVERY MORNING
Qty: 30 TABLET | Refills: 0 | Status: SHIPPED | OUTPATIENT
Start: 2025-03-26

## 2025-04-03 ENCOUNTER — APPOINTMENT (OUTPATIENT)
Dept: NUTRITION | Facility: HOSPITAL | Age: 30
End: 2025-04-03
Payer: COMMERCIAL

## 2025-04-14 NOTE — PROGRESS NOTES
"Chief Complaint    Annual Exam        HPI    PAP 23 NEG  STD SCREEN 9-15-22 NEG  GARDASIL X3 PER PT  Last edited by Wei Pratt MA on 4/15/2025 10:51 AM.         29 y.o.  female presents for annual well woman exam.   H/O PCOS. Patient's menstrual cycles are irregular, but have been monthly over the past few cycles. Periods lasting 5 days. Denies abnormal bleeding. Has had to do cyclic progesterone in the past. Issues with birth control side effects.   She is sexually active with . Denies dyspareunia.   Contraception: None. Wants to try for pregnancy. Has used Letrazole to conceive in the past.   She denies any breast concerns.   Pap hx. normal. Last pap: 2023 negative  Denies pelvic pain.  Denies abnormal vaginal symptoms.  Denies urinary or bowel concerns.   She is non-smoker  PMH: Anxiety, depression, obesity BMI 45.91  Family h/o breast cancer: None  Family h/o GYN cancer: None  Family h/o colon cancer: PGF?    /80   Ht 1.575 m (5' 2\")   Wt 114 kg (251 lb)   LMP 2025   BMI 45.91 kg/m²       Current Outpatient Medications   Medication Instructions    buPROPion XL (WELLBUTRIN XL) 150 mg, oral, Every morning, DO NOT CRUSH CHEW OR SPLIT        Review of Systems   Constitutional: Negative.    HENT: Negative.     Eyes: Negative.    Respiratory: Negative.     Cardiovascular: Negative.    Gastrointestinal: Negative.    Endocrine: Negative.    Genitourinary: Negative.    Musculoskeletal: Negative.    Skin: Negative.    Allergic/Immunologic: Negative.    Neurological: Negative.    Hematological: Negative.    Psychiatric/Behavioral: Negative.     All other systems reviewed and are negative.       Physical Exam  Constitutional:       Appearance: Normal appearance.   HENT:      Head: Normocephalic.      Nose: Nose normal.   Cardiovascular:      Rate and Rhythm: Normal rate and regular rhythm.   Pulmonary:      Effort: Pulmonary effort is normal.      Breath sounds: Normal breath sounds. "   Chest:   Breasts:     Right: Normal.      Left: Normal.   Abdominal:      General: Abdomen is flat.      Palpations: Abdomen is soft.   Genitourinary:     General: Normal vulva.      Vagina: Normal.      Cervix: Normal.      Uterus: Normal.       Adnexa: Right adnexa normal and left adnexa normal.      Rectum: Normal.      Comments: Pap not collected  Bimanual exam limited d/t habius  Musculoskeletal:         General: Normal range of motion.      Cervical back: Normal range of motion and neck supple.   Skin:     General: Skin is warm and dry.   Neurological:      Mental Status: She is alert.   Psychiatric:         Mood and Affect: Mood normal.         Behavior: Behavior normal.          Assessment/Plan:   1. Well woman exam with routine gynecological exam (Primary)  -Pap test not collected today. Last pap was in 05/2023 and was negative. Guidelines discussed.   -Self breast awareness exams reviewed.  -Has upcoming appointment scheduled with Dr. Hernandez to discuss Letrazole.   -Advised yearly well woman exams.   -Follow up sooner if needed.

## 2025-04-15 ENCOUNTER — APPOINTMENT (OUTPATIENT)
Dept: OBSTETRICS AND GYNECOLOGY | Facility: CLINIC | Age: 30
End: 2025-04-15
Payer: COMMERCIAL

## 2025-04-15 VITALS
HEIGHT: 62 IN | DIASTOLIC BLOOD PRESSURE: 80 MMHG | SYSTOLIC BLOOD PRESSURE: 120 MMHG | BODY MASS INDEX: 46.19 KG/M2 | WEIGHT: 251 LBS

## 2025-04-15 DIAGNOSIS — Z01.419 WELL WOMAN EXAM WITH ROUTINE GYNECOLOGICAL EXAM: Primary | ICD-10-CM

## 2025-04-15 PROCEDURE — 3074F SYST BP LT 130 MM HG: CPT | Performed by: NURSE PRACTITIONER

## 2025-04-15 PROCEDURE — 3079F DIAST BP 80-89 MM HG: CPT | Performed by: NURSE PRACTITIONER

## 2025-04-15 PROCEDURE — 1036F TOBACCO NON-USER: CPT | Performed by: NURSE PRACTITIONER

## 2025-04-15 PROCEDURE — 99395 PREV VISIT EST AGE 18-39: CPT | Performed by: NURSE PRACTITIONER

## 2025-04-15 PROCEDURE — 3008F BODY MASS INDEX DOCD: CPT | Performed by: NURSE PRACTITIONER

## 2025-04-15 RX ORDER — BISMUTH SUBSALICYLATE 262 MG
1 TABLET,CHEWABLE ORAL DAILY
COMMUNITY

## 2025-04-15 ASSESSMENT — ENCOUNTER SYMPTOMS
CARDIOVASCULAR NEGATIVE: 1
GASTROINTESTINAL NEGATIVE: 1
HEMATOLOGIC/LYMPHATIC NEGATIVE: 1
ENDOCRINE NEGATIVE: 1
ALLERGIC/IMMUNOLOGIC NEGATIVE: 1
RESPIRATORY NEGATIVE: 1
PSYCHIATRIC NEGATIVE: 1
NEUROLOGICAL NEGATIVE: 1
CONSTITUTIONAL NEGATIVE: 1
MUSCULOSKELETAL NEGATIVE: 1
EYES NEGATIVE: 1

## 2025-04-18 ENCOUNTER — APPOINTMENT (OUTPATIENT)
Dept: PRIMARY CARE | Facility: CLINIC | Age: 30
End: 2025-04-18
Payer: COMMERCIAL

## 2025-05-08 ENCOUNTER — APPOINTMENT (OUTPATIENT)
Dept: OBSTETRICS AND GYNECOLOGY | Facility: CLINIC | Age: 30
End: 2025-05-08
Payer: COMMERCIAL

## 2025-05-12 DIAGNOSIS — Z00.00 ROUTINE GENERAL MEDICAL EXAMINATION AT A HEALTH CARE FACILITY: ICD-10-CM

## 2025-05-12 DIAGNOSIS — F41.9 ANXIETY: ICD-10-CM

## 2025-05-12 DIAGNOSIS — Z78.9 HEALTH MANAGEMENT DEFICIT: ICD-10-CM

## 2025-05-12 RX ORDER — BUPROPION HYDROCHLORIDE 150 MG/1
150 TABLET ORAL EVERY MORNING
Qty: 30 TABLET | Refills: 0 | Status: SHIPPED | OUTPATIENT
Start: 2025-05-12

## 2025-05-23 ENCOUNTER — APPOINTMENT (OUTPATIENT)
Dept: OBSTETRICS AND GYNECOLOGY | Facility: CLINIC | Age: 30
End: 2025-05-23
Payer: COMMERCIAL

## 2025-06-06 ENCOUNTER — APPOINTMENT (OUTPATIENT)
Dept: OBSTETRICS AND GYNECOLOGY | Facility: CLINIC | Age: 30
End: 2025-06-06
Payer: COMMERCIAL

## 2025-07-23 DIAGNOSIS — F41.9 ANXIETY: ICD-10-CM

## 2025-07-23 DIAGNOSIS — Z78.9 HEALTH MANAGEMENT DEFICIT: ICD-10-CM

## 2025-07-23 DIAGNOSIS — Z00.00 ROUTINE GENERAL MEDICAL EXAMINATION AT A HEALTH CARE FACILITY: ICD-10-CM

## 2025-07-23 RX ORDER — BUPROPION HYDROCHLORIDE 150 MG/1
150 TABLET ORAL EVERY MORNING
Qty: 30 TABLET | Refills: 0 | Status: SHIPPED | OUTPATIENT
Start: 2025-07-23